# Patient Record
Sex: FEMALE
[De-identification: names, ages, dates, MRNs, and addresses within clinical notes are randomized per-mention and may not be internally consistent; named-entity substitution may affect disease eponyms.]

---

## 2021-03-10 ENCOUNTER — APPOINTMENT (OUTPATIENT)
Dept: OTOLARYNGOLOGY | Facility: CLINIC | Age: 31
End: 2021-03-10

## 2021-03-10 ENCOUNTER — NON-APPOINTMENT (OUTPATIENT)
Age: 31
End: 2021-03-10

## 2021-03-10 ENCOUNTER — APPOINTMENT (OUTPATIENT)
Dept: OTOLARYNGOLOGY | Facility: CLINIC | Age: 31
End: 2021-03-10
Payer: COMMERCIAL

## 2021-03-10 DIAGNOSIS — Z86.59 PERSONAL HISTORY OF OTHER MENTAL AND BEHAVIORAL DISORDERS: ICD-10-CM

## 2021-03-10 DIAGNOSIS — Z86.2 PERSONAL HISTORY OF DISEASES OF THE BLOOD AND BLOOD-FORMING ORGANS AND CERTAIN DISORDERS INVOLVING THE IMMUNE MECHANISM: ICD-10-CM

## 2021-03-10 DIAGNOSIS — Z83.438 FAMILY HISTORY OF OTHER DISORDER OF LIPOPROTEIN METABOLISM AND OTHER LIPIDEMIA: ICD-10-CM

## 2021-03-10 DIAGNOSIS — Z86.39 PERSONAL HISTORY OF OTHER ENDOCRINE, NUTRITIONAL AND METABOLIC DISEASE: ICD-10-CM

## 2021-03-10 DIAGNOSIS — Z87.39 PERSONAL HISTORY OF OTHER DISEASES OF THE MUSCULOSKELETAL SYSTEM AND CONNECTIVE TISSUE: ICD-10-CM

## 2021-03-10 DIAGNOSIS — H93.19 TINNITUS, UNSPECIFIED EAR: ICD-10-CM

## 2021-03-10 PROBLEM — Z00.00 ENCOUNTER FOR PREVENTIVE HEALTH EXAMINATION: Status: ACTIVE | Noted: 2021-03-10

## 2021-03-10 PROCEDURE — 99072 ADDL SUPL MATRL&STAF TM PHE: CPT

## 2021-03-10 PROCEDURE — 31231 NASAL ENDOSCOPY DX: CPT

## 2021-03-10 PROCEDURE — 99204 OFFICE O/P NEW MOD 45 MIN: CPT | Mod: 25

## 2021-03-10 RX ORDER — ACETAMINOPHEN, ASPIRIN, AND CAFFEINE 250; 250; 65 MG/1; MG/1; MG/1
TABLET, FILM COATED ORAL
Refills: 0 | Status: ACTIVE | COMMUNITY

## 2021-03-10 RX ORDER — PAROXETINE HYDROCHLORIDE 40 MG/1
TABLET, FILM COATED ORAL
Refills: 0 | Status: ACTIVE | COMMUNITY

## 2021-03-10 RX ORDER — DICYCLOMINE HYDROCHLORIDE 20.6 MG/1
TABLET ORAL
Refills: 0 | Status: ACTIVE | COMMUNITY

## 2021-03-10 RX ORDER — PANTOPRAZOLE SODIUM 20 MG/1
TABLET, DELAYED RELEASE ORAL
Refills: 0 | Status: ACTIVE | COMMUNITY

## 2021-03-10 RX ORDER — TRAZODONE HYDROCHLORIDE 300 MG/1
TABLET ORAL
Refills: 0 | Status: ACTIVE | COMMUNITY
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
## 2021-03-10 NOTE — ASSESSMENT
[FreeTextEntry1] : She has a history of chronic rhinitis. She said allergy testing in the past was negative. She had mild mucosal edema on nasal endoscopy. The septum was also mildly deviated. She occasionally has nosebleeds. There were no clots or evidence of recent bleeding on exam today. \par \par She has a history of reflux. She is on Protonix and sees a gastroenterologist\par \par She has ear pain. Her ears were normal on exam. This is referred otalgia. We discussed possible etiologies including TMJ dysfunction, migraines, chronic sinusitis, and reflux. Audiogram shows mild bilateral sensorineural hearing loss which is similar to her prior test in 2013\par \par PLAN\par \par -findings and management options discussed in detail with the patient. \par -good aural hygiene\par -noise precautions\par -annual audiogram\par -Nasal saline rinses, nasal steroid spray (such as fluticasone), antihistamine/decongestant as needed \par -I will give her astelin to try\par -continue management of reflux\par -dental evaluation for TMJD recommended. she was given literature\par -consider neurology evaluation for the migraines. \par -follow up in 3 months if doing well. I asked her to call me and come in to check her ears if she feels that she has an ear infection\par -call and return earlier if any concerns. \par

## 2021-03-21 ENCOUNTER — RESULT CHARGE (OUTPATIENT)
Age: 31
End: 2021-03-21

## 2021-03-22 PROBLEM — H93.19 TINNITUS, UNSPECIFIED LATERALITY: Status: ACTIVE | Noted: 2021-03-22

## 2021-06-30 ENCOUNTER — RX RENEWAL (OUTPATIENT)
Age: 31
End: 2021-06-30

## 2021-06-30 RX ORDER — AZELASTINE HYDROCHLORIDE 137 UG/1
0.1 SPRAY, METERED NASAL TWICE DAILY
Qty: 1 | Refills: 3 | Status: ACTIVE | COMMUNITY
Start: 2021-03-10 | End: 1900-01-01

## 2022-02-25 ENCOUNTER — TRANSCRIPTION ENCOUNTER (OUTPATIENT)
Age: 32
End: 2022-02-25

## 2022-02-25 ENCOUNTER — NON-APPOINTMENT (OUTPATIENT)
Age: 32
End: 2022-02-25

## 2022-02-25 ENCOUNTER — APPOINTMENT (OUTPATIENT)
Dept: OPHTHALMOLOGY | Facility: CLINIC | Age: 32
End: 2022-02-25

## 2022-02-25 ENCOUNTER — APPOINTMENT (OUTPATIENT)
Dept: OPHTHALMOLOGY | Facility: CLINIC | Age: 32
End: 2022-02-25
Payer: COMMERCIAL

## 2022-02-25 PROCEDURE — 92004 COMPRE OPH EXAM NEW PT 1/>: CPT

## 2022-02-25 PROCEDURE — 99072 ADDL SUPL MATRL&STAF TM PHE: CPT

## 2022-02-25 PROCEDURE — 92133 CPTRZD OPH DX IMG PST SGM ON: CPT

## 2022-02-25 PROCEDURE — 92083 EXTENDED VISUAL FIELD XM: CPT

## 2022-03-01 ENCOUNTER — APPOINTMENT (OUTPATIENT)
Dept: OTOLARYNGOLOGY | Facility: CLINIC | Age: 32
End: 2022-03-01
Payer: COMMERCIAL

## 2022-03-01 DIAGNOSIS — J31.0 CHRONIC RHINITIS: ICD-10-CM

## 2022-03-01 DIAGNOSIS — H92.03 OTALGIA, BILATERAL: ICD-10-CM

## 2022-03-01 PROCEDURE — 92557 COMPREHENSIVE HEARING TEST: CPT

## 2022-03-01 PROCEDURE — 99072 ADDL SUPL MATRL&STAF TM PHE: CPT

## 2022-03-01 PROCEDURE — 92567 TYMPANOMETRY: CPT

## 2022-03-01 PROCEDURE — 99213 OFFICE O/P EST LOW 20 MIN: CPT

## 2022-03-01 RX ORDER — FLUTICASONE PROPIONATE 50 UG/1
50 SPRAY, METERED NASAL DAILY
Qty: 1 | Refills: 3 | Status: DISCONTINUED | COMMUNITY
Start: 2022-03-01 | End: 2022-03-01

## 2022-03-01 RX ORDER — FLUTICASONE PROPIONATE 50 UG/1
50 SPRAY, METERED NASAL DAILY
Qty: 1 | Refills: 3 | Status: ACTIVE | COMMUNITY
Start: 2022-03-01 | End: 1900-01-01

## 2022-03-01 NOTE — ASSESSMENT
[FreeTextEntry1] : She has bilateral ear pressure and right otalgia.  She was concerned about an ear infection.  Her ears were normal on exam.  There was no otitis externa or otitis media.  The tympanic membranes were normal.  Audiogram showed a stable mild bilateral high-frequency sensorineural hearing loss with normal middle ear pressures.  The ear pain may be due to TMJ dysfunction.  She also suffers from migraines.\par \par She has a history of dizziness.  She had a VNG recently which showed weakness on caloric testing as well as a central ocular motor finding.  We discussed possible etiologies for the dizziness.\par \par PLAN\par \par -findings and management options discussed in detail with the patient. \par -good aural hygiene\par -avoid using cotton swabs in the ears\par -noise precautions\par -annual audiogram\par -We discussed whether or not she should continue the antibiotics.  She has only taken them for 1 day.  Since there is no obvious ear infection, she may discontinue them\par -low salt diet and avoidance of caffeine, alcohol and nicotine may help inner ear related dizziness\par -meclizine prn severe dizziness-I would hold off for now since the episodes occur when she bends down or goes downstairs.\par -She had a recent MRI of the brain without contrast.  Depending on how she does, we can discuss obtaining an MRI with contrast of the IACs\par -vestibular therapy recommended\par -Continue with management for reflux and TMJ dysfunction.\par -She will continue to work with the neurologist for the migraines.  She is also seeing a neuro-ophthalmologist.\par -follow up or call with an update after the vestibular therapy evaluation.  We will discuss further work up.  Consider repeat NE/FL.  \par -call and return earlier if any concerns.
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
## 2022-06-13 ENCOUNTER — NON-APPOINTMENT (OUTPATIENT)
Age: 32
End: 2022-06-13

## 2022-06-13 ENCOUNTER — APPOINTMENT (OUTPATIENT)
Dept: OPHTHALMOLOGY | Facility: CLINIC | Age: 32
End: 2022-06-13
Payer: COMMERCIAL

## 2022-06-13 PROCEDURE — 99072 ADDL SUPL MATRL&STAF TM PHE: CPT

## 2022-06-13 PROCEDURE — 92014 COMPRE OPH EXAM EST PT 1/>: CPT

## 2022-06-13 PROCEDURE — 92083 EXTENDED VISUAL FIELD XM: CPT

## 2022-06-13 PROCEDURE — 92133 CPTRZD OPH DX IMG PST SGM ON: CPT

## 2022-09-12 ENCOUNTER — APPOINTMENT (OUTPATIENT)
Dept: OTOLARYNGOLOGY | Facility: CLINIC | Age: 32
End: 2022-09-12

## 2024-01-11 ENCOUNTER — APPOINTMENT (OUTPATIENT)
Dept: OTOLARYNGOLOGY | Facility: CLINIC | Age: 34
End: 2024-01-11
Payer: COMMERCIAL

## 2024-01-11 DIAGNOSIS — H90.3 SENSORINEURAL HEARING LOSS, BILATERAL: ICD-10-CM

## 2024-01-11 PROCEDURE — 92557 COMPREHENSIVE HEARING TEST: CPT

## 2024-01-11 PROCEDURE — 69210 REMOVE IMPACTED EAR WAX UNI: CPT

## 2024-01-11 PROCEDURE — 92567 TYMPANOMETRY: CPT

## 2024-01-11 PROCEDURE — 99213 OFFICE O/P EST LOW 20 MIN: CPT | Mod: 25

## 2024-01-11 NOTE — ASSESSMENT
[FreeTextEntry1] : She had cerumen impaction on the left side which was removed. There was scant cerumen on the right side which was removed. There was mild irritation which was treated with powder.  Audiogram showed a bilateral High frequency SNHL. She had a type B tympanogram on the right side but there was no effusion  PLAN  -findings and management options discussed in detail with the patient.  -good aural hygiene and dry ear precautions -avoid using cotton swabs in the ears -noise precautions -monitor hearing. repeat in 6 months  -she will call if she has ear pain or drainage. I will place her on ear drops -She deferred repeat flexible laryngoscopy as the ear pain is better -we again discussed obtaining an MRI with contrast of the IACs. She is going to hold off for now.   -she may consider HAE -follow up in 6 months if doing well.  -call and return earlier if any concerns or worsening symptoms

## 2024-01-11 NOTE — PHYSICAL EXAM
[TextEntry] : PHYSICAL EXAM  General: The patient was alert, oriented and in no distress. Voice was clear.  Face: The patient had no facial asymmetry or mass. The skin was unremarkable.  Ears: Hearing normal to conversational voice External ears were normal without deformity. PROCEDURE- EAR MICROSCOPY AND CERUMEN REMOVAL  Indication: cerumen removal Under the microscope, obstructing cerumen was removed atraumatically from the left ear with a suction, curette and/or forceps.  There was a small amount of wax on the right side which was removed. Canal(s): AD-mild irritation treated with powder. AS-normal. no inflammation.  Tympanic membrane(s): Intact. no perforation or effusion.  Nose:  The external nose had no significant deformity.  There was no facial tenderness.  On anterior rhinoscopy, the nasal mucosa was normal.   The anterior septum was grossly midline. There were no visualized polyps, purulence  or masses.   Oral cavity: Oral mucosa- normal. Oral and base of tongue- clear and without mass. Gingival and buccal mucosa- moist and without lesions. Palate- the palate moved well. There was no cleft palate. There appeared to be good salivary flow.   Oral cavity/oropharynx- no pus, erythema or mass  Neck:  The neck was symmetrical. The parotid and submandibular glands were normal without masses. The trachea was midline and there was no unusual crepitus. Thyroid was smooth and nontender and no masses were palpated. No masses  Lymphatics: Cervical adenopathy- none.     AUDIOGRAM- test ordered and the results reviewed and discussed with the patient. it was compared to her prior audiogram Hearing- mild to moderated high frequency SNHL Word recognition- AD-88%. AS-96% Tympanograms- AD- type B, AS- type A
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
## 2024-01-18 ENCOUNTER — APPOINTMENT (OUTPATIENT)
Dept: OTOLARYNGOLOGY | Facility: CLINIC | Age: 34
End: 2024-01-18
Payer: COMMERCIAL

## 2024-01-18 PROCEDURE — ZZZZZ: CPT

## 2024-01-25 ENCOUNTER — APPOINTMENT (OUTPATIENT)
Dept: MRI IMAGING | Facility: CLINIC | Age: 34
End: 2024-01-25
Payer: COMMERCIAL

## 2024-01-25 ENCOUNTER — OUTPATIENT (OUTPATIENT)
Dept: OUTPATIENT SERVICES | Facility: HOSPITAL | Age: 34
LOS: 1 days | End: 2024-01-25

## 2024-01-25 PROCEDURE — 70553 MRI BRAIN STEM W/O & W/DYE: CPT | Mod: 26

## 2024-02-06 ENCOUNTER — APPOINTMENT (OUTPATIENT)
Dept: OTOLARYNGOLOGY | Facility: CLINIC | Age: 34
End: 2024-02-06
Payer: COMMERCIAL

## 2024-02-06 DIAGNOSIS — R42 DIZZINESS AND GIDDINESS: ICD-10-CM

## 2024-02-06 DIAGNOSIS — R59.0 LOCALIZED ENLARGED LYMPH NODES: ICD-10-CM

## 2024-02-06 DIAGNOSIS — M26.609 UNSPECIFIED TEMPOROMANDIBULAR JOINT DISORDER: ICD-10-CM

## 2024-02-06 PROCEDURE — 99441: CPT

## 2024-02-06 NOTE — ASSESSMENT
[FreeTextEntry1] : She has a history of dizziness and headaches.  Her MRI was negative for IAC masses.  She did have an incidental 1.2 cm left suboccipital lymph node which was indeterminate.  Plan -Findings and management options were discussed with the patient. -Continue good ear hygiene -Monitor hearing -We discussed management for the mildly enlarged lymph node.  Her options are observation or ultrasound guided FNA.  She is going to go for an ultrasound-guided FNA of the lymph node. -I have asked her to call for the results -I will see her back earlier if she has any problems or worsening symptom
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
## 2024-02-21 ENCOUNTER — NON-APPOINTMENT (OUTPATIENT)
Age: 34
End: 2024-02-21

## 2024-07-11 ENCOUNTER — APPOINTMENT (OUTPATIENT)
Dept: OTOLARYNGOLOGY | Facility: CLINIC | Age: 34
End: 2024-07-11
Payer: COMMERCIAL

## 2024-07-11 DIAGNOSIS — H90.3 SENSORINEURAL HEARING LOSS, BILATERAL: ICD-10-CM

## 2024-07-11 DIAGNOSIS — R42 DIZZINESS AND GIDDINESS: ICD-10-CM

## 2024-07-11 DIAGNOSIS — R59.0 LOCALIZED ENLARGED LYMPH NODES: ICD-10-CM

## 2024-07-11 DIAGNOSIS — J31.0 CHRONIC RHINITIS: ICD-10-CM

## 2024-07-11 DIAGNOSIS — H61.22 IMPACTED CERUMEN, LEFT EAR: ICD-10-CM

## 2024-07-11 DIAGNOSIS — M26.609 UNSPECIFIED TEMPOROMANDIBULAR JOINT DISORDER: ICD-10-CM

## 2024-07-11 PROCEDURE — 99214 OFFICE O/P EST MOD 30 MIN: CPT | Mod: 25

## 2024-07-11 PROCEDURE — 69210 REMOVE IMPACTED EAR WAX UNI: CPT

## 2024-07-11 RX ORDER — IPRATROPIUM BROMIDE 21 UG/1
0.03 SPRAY NASAL
Qty: 1 | Refills: 3 | Status: ACTIVE | COMMUNITY
Start: 2024-07-11 | End: 1900-01-01